# Patient Record
Sex: FEMALE | Race: WHITE | Employment: OTHER | URBAN - METROPOLITAN AREA
[De-identification: names, ages, dates, MRNs, and addresses within clinical notes are randomized per-mention and may not be internally consistent; named-entity substitution may affect disease eponyms.]

---

## 2018-01-12 PROCEDURE — 87660 TRICHOMONAS VAGIN DIR PROBE: CPT | Performed by: EMERGENCY MEDICINE

## 2018-01-12 PROCEDURE — 87510 GARDNER VAG DNA DIR PROBE: CPT | Performed by: EMERGENCY MEDICINE

## 2018-01-12 PROCEDURE — 87480 CANDIDA DNA DIR PROBE: CPT | Performed by: EMERGENCY MEDICINE

## 2018-01-12 PROCEDURE — 87491 CHLMYD TRACH DNA AMP PROBE: CPT | Performed by: EMERGENCY MEDICINE

## 2018-01-12 PROCEDURE — 87591 N.GONORRHOEAE DNA AMP PROB: CPT | Performed by: EMERGENCY MEDICINE

## 2022-05-04 ENCOUNTER — OFFICE VISIT (OUTPATIENT)
Dept: INTERNAL MEDICINE CLINIC | Facility: CLINIC | Age: 47
End: 2022-05-04
Payer: COMMERCIAL

## 2022-05-04 VITALS
HEART RATE: 86 BPM | BODY MASS INDEX: 34.78 KG/M2 | DIASTOLIC BLOOD PRESSURE: 82 MMHG | RESPIRATION RATE: 16 BRPM | SYSTOLIC BLOOD PRESSURE: 110 MMHG | HEIGHT: 62 IN | WEIGHT: 189 LBS

## 2022-05-04 DIAGNOSIS — E66.9 CLASS 1 OBESITY WITH BODY MASS INDEX (BMI) OF 34.0 TO 34.9 IN ADULT, UNSPECIFIED OBESITY TYPE, UNSPECIFIED WHETHER SERIOUS COMORBIDITY PRESENT: ICD-10-CM

## 2022-05-04 DIAGNOSIS — E55.9 VITAMIN D DEFICIENCY: ICD-10-CM

## 2022-05-04 DIAGNOSIS — N20.0 NEPHROLITHIASIS: ICD-10-CM

## 2022-05-04 DIAGNOSIS — Z51.81 ENCOUNTER FOR THERAPEUTIC DRUG LEVEL MONITORING: Primary | ICD-10-CM

## 2022-05-04 DIAGNOSIS — E01.0 THYROMEGALY: ICD-10-CM

## 2022-05-04 PROCEDURE — 99204 OFFICE O/P NEW MOD 45 MIN: CPT | Performed by: PHYSICIAN ASSISTANT

## 2022-05-04 PROCEDURE — 3079F DIAST BP 80-89 MM HG: CPT | Performed by: PHYSICIAN ASSISTANT

## 2022-05-04 PROCEDURE — 3074F SYST BP LT 130 MM HG: CPT | Performed by: PHYSICIAN ASSISTANT

## 2022-05-04 PROCEDURE — 3008F BODY MASS INDEX DOCD: CPT | Performed by: PHYSICIAN ASSISTANT

## 2022-05-04 RX ORDER — LIRAGLUTIDE 6 MG/ML
INJECTION, SOLUTION SUBCUTANEOUS
COMMUNITY

## 2022-05-04 NOTE — PROGRESS NOTES
Patient teaching on [facility] [address] performed. Patient demonstrated back, all questions were answered and patient verbalized understanding. MT

## 2022-05-06 LAB
ABSOLUTE BASOPHILS: 36 CELLS/UL (ref 0–200)
ABSOLUTE EOSINOPHILS: 160 CELLS/UL (ref 15–500)
ABSOLUTE LYMPHOCYTES: 2884 CELLS/UL (ref 850–3900)
ABSOLUTE MONOCYTES: 498 CELLS/UL (ref 200–950)
ABSOLUTE NEUTROPHILS: 5322 CELLS/UL (ref 1500–7800)
ALBUMIN/GLOBULIN RATIO: 1.5 (CALC) (ref 1–2.5)
ALBUMIN: 4.3 G/DL (ref 3.6–5.1)
ALKALINE PHOSPHATASE: 84 U/L (ref 31–125)
ALT: 40 U/L (ref 6–29)
AST: 26 U/L (ref 10–30)
BASOPHILS: 0.4 %
BILIRUBIN, TOTAL: 1 MG/DL (ref 0.2–1.2)
BUN: 14 MG/DL (ref 7–25)
CALCIUM: 9.7 MG/DL (ref 8.6–10.2)
CARBON DIOXIDE: 30 MMOL/L (ref 20–32)
CHLORIDE: 102 MMOL/L (ref 98–110)
CHOL/HDLC RATIO: 2.8 (CALC)
CHOLESTEROL, TOTAL: 202 MG/DL
CREATININE: 0.78 MG/DL (ref 0.5–1.1)
EGFR IF AFRICN AM: 107 ML/MIN/1.73M2
EGFR IF NONAFRICN AM: 92 ML/MIN/1.73M2
EOSINOPHILS: 1.8 %
GLOBULIN: 2.9 G/DL (CALC) (ref 1.9–3.7)
GLUCOSE: 84 MG/DL (ref 65–99)
HDL CHOLESTEROL: 73 MG/DL
HEMATOCRIT: 39.9 % (ref 35–45)
HEMOGLOBIN A1C: 4.9 % OF TOTAL HGB
HEMOGLOBIN: 13.6 G/DL (ref 11.7–15.5)
LDL-CHOLESTEROL: 113 MG/DL (CALC)
LYMPHOCYTES: 32.4 %
MCH: 29.6 PG (ref 27–33)
MCHC: 34.1 G/DL (ref 32–36)
MCV: 86.7 FL (ref 80–100)
MONOCYTES: 5.6 %
MPV: 10.1 FL (ref 7.5–12.5)
NEUTROPHILS: 59.8 %
NON-HDL CHOLESTEROL: 129 MG/DL (CALC)
PLATELET COUNT: 437 THOUSAND/UL (ref 140–400)
POTASSIUM: 4.6 MMOL/L (ref 3.5–5.3)
PROTEIN, TOTAL: 7.2 G/DL (ref 6.1–8.1)
RDW: 12.8 % (ref 11–15)
RED BLOOD CELL COUNT: 4.6 MILLION/UL (ref 3.8–5.1)
SODIUM: 138 MMOL/L (ref 135–146)
T4, FREE: 1.3 NG/DL (ref 0.8–1.8)
THYROGLOBULIN ANTIBODIES: <1 IU/ML
THYROID PEROXIDASE$ANTIBODIES: <1 IU/ML
TRIGLYCERIDES: 70 MG/DL
TSH: 1.44 MIU/L
VITAMIN B12: 472 PG/ML (ref 200–1100)
WHITE BLOOD CELL COUNT: 8.9 THOUSAND/UL (ref 3.8–10.8)

## 2022-05-10 ENCOUNTER — TELEPHONE (OUTPATIENT)
Dept: INTERNAL MEDICINE CLINIC | Facility: CLINIC | Age: 47
End: 2022-05-10

## 2022-05-10 NOTE — TELEPHONE ENCOUNTER
Patient left a message on nurse line last week that she was sick with med. I called her today as I have been away and apologized for delay. She was started on saxenda and got very sick - but she now feels much better. I went over possible issues causing more problems (eating high carbs, eating large portions, not eating enough, drinking plenty of water). Patient was very kind and said she feels fine now. Will be careful and would have called back if nobody returned her call and she needed further help.

## 2022-05-23 ENCOUNTER — PATIENT MESSAGE (OUTPATIENT)
Dept: INTERNAL MEDICINE CLINIC | Facility: CLINIC | Age: 47
End: 2022-05-23

## 2022-05-23 NOTE — TELEPHONE ENCOUNTER
From: Harjit Marlow  To: Emily Pittman PA-C  Sent: 5/23/2022 12:08 PM CDT  Subject: Refill    Hi -   I was given a sample of the Saxeda, but need to get a refill. How do I go about doing that?     Thanks,  Jay Grant

## 2022-05-23 NOTE — TELEPHONE ENCOUNTER
Requesting Zach  LOV: 5/4/22  RTC: not noted  Last Relevant Labs: na  Filled: not noted?   Given sample:    Future Appointments   Date Time Provider Endy Harrell   7/20/2022  9:40 AM Sofia Clifford PA-C [address] [facility] [address]

## 2022-05-24 RX ORDER — LIRAGLUTIDE 6 MG/ML
3 INJECTION, SOLUTION SUBCUTANEOUS DAILY
Qty: 15 ML | Refills: 1 | Status: SHIPPED | OUTPATIENT
Start: 2022-05-24 | End: 2022-05-26

## 2022-05-24 RX ORDER — PEN NEEDLE, DIABETIC 30 GX3/16"
1 NEEDLE, DISPOSABLE MISCELLANEOUS DAILY
Qty: 100 EACH | Refills: 1 | Status: SHIPPED | OUTPATIENT
Start: 2022-05-24

## 2022-05-27 ENCOUNTER — TELEPHONE (OUTPATIENT)
Dept: INTERNAL MEDICINE CLINIC | Facility: CLINIC | Age: 47
End: 2022-05-27

## 2022-05-27 NOTE — TELEPHONE ENCOUNTER
PA needed for Ozempic 0.25 mg weekly  [phone] [facility]  March Repress  Had to transfer to [facility] Salgado  ID [id number]    Class 1 obesity with body mass index (BMI) of 34.0 to 34.9 in adult, unspecified obesity type, unspecified whether serious comorbidity present  E66.9   Hyperlipidemia E78.5 per Shelbi Avon to clinical review - processed in 5 days  Case #[id number]

## 2022-06-08 ENCOUNTER — TELEPHONE (OUTPATIENT)
Dept: INTERNAL MEDICINE CLINIC | Facility: CLINIC | Age: 47
End: 2022-06-08

## 2022-06-08 RX ORDER — METFORMIN HYDROCHLORIDE 750 MG/1
750 TABLET, EXTENDED RELEASE ORAL DAILY
Qty: 90 TABLET | Refills: 0 | Status: SHIPPED | OUTPATIENT
Start: 2022-06-08

## 2022-06-08 RX ORDER — LIRAGLUTIDE 6 MG/ML
INJECTION, SOLUTION SUBCUTANEOUS
Qty: 15 ML | Refills: 0 | Status: SHIPPED | OUTPATIENT
Start: 2022-06-08 | End: 2022-08-05

## 2022-06-08 NOTE — TELEPHONE ENCOUNTER
I spoke to pt, sent metformin to her pharmacy    Her insurance shows that a prior authorization was needed for [address] [place] [address], but I don't see that we ever did one    I also sent a letter to her for her insurance for [address] [place] [address]

## 2022-07-20 ENCOUNTER — OFFICE VISIT (OUTPATIENT)
Dept: INTERNAL MEDICINE CLINIC | Facility: CLINIC | Age: 47
End: 2022-07-20
Payer: COMMERCIAL

## 2022-07-20 VITALS
WEIGHT: 185 LBS | DIASTOLIC BLOOD PRESSURE: 78 MMHG | RESPIRATION RATE: 16 BRPM | SYSTOLIC BLOOD PRESSURE: 120 MMHG | BODY MASS INDEX: 34.04 KG/M2 | HEART RATE: 83 BPM | HEIGHT: 62 IN

## 2022-07-20 DIAGNOSIS — Z51.81 ENCOUNTER FOR THERAPEUTIC DRUG LEVEL MONITORING: Primary | ICD-10-CM

## 2022-07-20 DIAGNOSIS — E66.9 CLASS 1 OBESITY WITH BODY MASS INDEX (BMI) OF 34.0 TO 34.9 IN ADULT, UNSPECIFIED OBESITY TYPE, UNSPECIFIED WHETHER SERIOUS COMORBIDITY PRESENT: ICD-10-CM

## 2022-07-20 DIAGNOSIS — E78.2 MIXED HYPERLIPIDEMIA: ICD-10-CM

## 2022-07-20 RX ORDER — METFORMIN HYDROCHLORIDE 750 MG/1
1500 TABLET, EXTENDED RELEASE ORAL
Qty: 60 TABLET | Refills: 1 | Status: SHIPPED | OUTPATIENT
Start: 2022-07-20

## 2022-07-20 RX ORDER — NALTREXONE HYDROCHLORIDE 50 MG/1
25 TABLET, FILM COATED ORAL DAILY
Qty: 30 TABLET | Refills: 0 | Status: SHIPPED | OUTPATIENT
Start: 2022-07-20

## 2022-07-20 RX ORDER — BUPROPION HYDROCHLORIDE 150 MG/1
150 TABLET ORAL DAILY
Qty: 30 TABLET | Refills: 1 | Status: SHIPPED | OUTPATIENT
Start: 2022-07-20

## 2022-09-12 ENCOUNTER — PATIENT MESSAGE (OUTPATIENT)
Dept: INTERNAL MEDICINE CLINIC | Facility: CLINIC | Age: 47
End: 2022-09-12

## 2022-09-12 RX ORDER — SEMAGLUTIDE 1.34 MG/ML
0.25 INJECTION, SOLUTION SUBCUTANEOUS WEEKLY
Qty: 1 PEN | Refills: 0 | Status: SHIPPED | OUTPATIENT
Start: 2022-09-12

## 2022-09-12 NOTE — TELEPHONE ENCOUNTER
From: Harriett Blind  To: Jeannette Diaz PA-C  Sent: 9/12/2022 9:26 AM CDT  Subject: [address] Edelmiro Dakin -    I hope you are doing well. I wanted to let you know my insurance changed and I wanted to see if we could start the process of one of the injectibles to see if it would be covered with my new plan? Not sure if the Saxeda or Ozympic would be best - but wanted to get that started to see? I had to stop taking the other medications, it was making me feel really sick. Hoping we can get the others covered! I attached my new insurance info!      Thanks,  Adele Perry

## 2022-09-12 NOTE — TELEPHONE ENCOUNTER
I spoke to Ronald and she will order Ozempic at starting dose. We will do PA if we are notified it is needed.

## 2022-09-16 ENCOUNTER — TELEPHONE (OUTPATIENT)
Dept: INTERNAL MEDICINE CLINIC | Facility: CLINIC | Age: 47
End: 2022-09-16

## 2022-09-16 NOTE — TELEPHONE ENCOUNTER
PA needed for ozempic 0.25 mg  [facility]    ID [id number]    Class 1 obesity with body mass index (BMI) of 34.0 to 34.9 in adult, unspecified obesity type, unspecified whether serious comorbidity present  E66.9, Z68.34    Mixed hyperlipidemia  E78.2      Sent to [place] to verify diagnosis needed to do PA

## 2022-09-16 NOTE — TELEPHONE ENCOUNTER
I called [facility] and got Ruperto Van. She placed me on hold for 30 min and then transferred for another 10 min and disconnected. Must try on Monday.

## 2022-09-19 ENCOUNTER — TELEPHONE (OUTPATIENT)
Dept: INTERNAL MEDICINE CLINIC | Facility: CLINIC | Age: 47
End: 2022-09-19

## 2022-09-22 RX ORDER — TIRZEPATIDE 2.5 MG/.5ML
2.5 INJECTION, SOLUTION SUBCUTANEOUS WEEKLY
Qty: 2 ML | Refills: 0 | Status: SHIPPED | OUTPATIENT
Start: 2022-09-22

## 2022-09-22 NOTE — TELEPHONE ENCOUNTER
Since ozempic isn't covered, we could do Mounjaro, let her know about the coupon  Also, I don't know if with her new insurance [employer] or [employer] would be covered, she could contact her new insurance and see

## 2022-09-25 RX ORDER — LIRAGLUTIDE 6 MG/ML
INJECTION, SOLUTION SUBCUTANEOUS
Qty: 15 ML | Refills: 0 | Status: SHIPPED | OUTPATIENT
Start: 2022-09-25 | End: 2022-11-22

## 2022-09-25 RX ORDER — PEN NEEDLE, DIABETIC 30 GX3/16"
1 NEEDLE, DISPOSABLE MISCELLANEOUS DAILY
Qty: 90 EACH | Refills: 0 | Status: SHIPPED | OUTPATIENT
Start: 2022-09-25 | End: 2022-12-24

## 2022-09-26 ENCOUNTER — TELEPHONE (OUTPATIENT)
Dept: INTERNAL MEDICINE CLINIC | Facility: CLINIC | Age: 47
End: 2022-09-26

## 2022-09-27 RX ORDER — TIRZEPATIDE 2.5 MG/.5ML
2.5 INJECTION, SOLUTION SUBCUTANEOUS WEEKLY
Qty: 2 ML | Refills: 0 | Status: SHIPPED | OUTPATIENT
Start: 2022-09-27 | End: 2022-10-24

## 2022-10-24 ENCOUNTER — PATIENT MESSAGE (OUTPATIENT)
Dept: INTERNAL MEDICINE CLINIC | Facility: CLINIC | Age: 47
End: 2022-10-24

## 2022-10-24 RX ORDER — TIRZEPATIDE 2.5 MG/.5ML
2.5 INJECTION, SOLUTION SUBCUTANEOUS WEEKLY
Qty: 2 ML | Refills: 0 | Status: SHIPPED | OUTPATIENT
Start: 2022-10-24

## 2022-11-14 NOTE — TELEPHONE ENCOUNTER
Requesting mounjaro same dose  LOV: 7/20/22  RTC: not noted  Last Relevant Labs: 5/522  Filled: 10/24/22 #2ml with 0 refills Mounjaro 2.5 mg    No future appointments. Pt cancelled her appt 9/21/22  I have asked her to reschedule now. Scared to move up in dose.

## 2022-11-15 RX ORDER — TIRZEPATIDE 2.5 MG/.5ML
2.5 INJECTION, SOLUTION SUBCUTANEOUS WEEKLY
Qty: 2 ML | Refills: 0 | Status: SHIPPED | OUTPATIENT
Start: 2022-11-15

## 2022-11-16 ENCOUNTER — PATIENT MESSAGE (OUTPATIENT)
Dept: INTERNAL MEDICINE CLINIC | Facility: CLINIC | Age: 47
End: 2022-11-16

## 2022-11-16 NOTE — TELEPHONE ENCOUNTER
If she is nervous to increase and doing well on the 2.5mg we could keep her on that another month, and then increase  Everyone is different though and if she has not had any side effects with the 2.5mg she may not with the 5mg, and she has been on the 2.5mg for 2 months so her body has gotten used to it

## 2022-11-21 NOTE — TELEPHONE ENCOUNTER
From: [place]  To: Allen Valencia  Sent: 9/26/2022 4:18 PM CDT  Subject: Jen Landaverde,  we applied for coverage on saxenda via your insurance and we gotten approval until 03/06/2023. Now you can let your pharmacy know so that they can get this medication ready. let us know if you have any questions or trouble getting your med.   Thank you  Nubia JEFFRIES

## 2022-11-22 NOTE — TELEPHONE ENCOUNTER
Requesting Mounjaro same dose  LOV: 7/20/22  RTC: not noted  Last Relevant Labs: 5/5/22  Filled: 11/15/22 #2ml with 0 refills    Future Appointments   Date Time Provider Endy Harrell   2/1/2023  9:00 AM Zeke Staton PA-C [address] [facility] [address]

## 2022-12-16 NOTE — TELEPHONE ENCOUNTER
See below still scheduled as listed and last seen in July.     She cancelled an appt in February  Wants to stay on Mounjaro 2.5 mg

## 2022-12-19 RX ORDER — TIRZEPATIDE 2.5 MG/.5ML
2.5 INJECTION, SOLUTION SUBCUTANEOUS WEEKLY
Qty: 2 ML | Refills: 0 | Status: SHIPPED | OUTPATIENT
Start: 2022-12-19

## (undated) NOTE — LETTER
6/8/2022    To Whom It May Concern,         I am writing this letter on behalf of my patient, Ruel Martinez, to express a concern. My patient is in need of prescription weight loss medication, Saxenda or MWNCRO, that is currently not covered by your insurance plan. It is well recognized that obesity is a chronic illbess associated with many related diseases: comparitively dyslipidemia and hypertension. Obesity deserves the same treatment and attention similar to any other chronic illness. Please contact your health plan or pharmacy benefit manager to pursue coverage for either this individual employee or for the company at large.         Sincerely,         Vamshi Alfaro PA-C